# Patient Record
Sex: FEMALE | Race: WHITE | URBAN - METROPOLITAN AREA
[De-identification: names, ages, dates, MRNs, and addresses within clinical notes are randomized per-mention and may not be internally consistent; named-entity substitution may affect disease eponyms.]

---

## 2019-06-05 ENCOUNTER — OFFICE VISIT (OUTPATIENT)
Dept: NEUROLOGY | Age: 24
End: 2019-06-05

## 2019-06-05 VITALS
OXYGEN SATURATION: 98 % | WEIGHT: 217 LBS | HEART RATE: 77 BPM | HEIGHT: 63 IN | DIASTOLIC BLOOD PRESSURE: 78 MMHG | BODY MASS INDEX: 38.45 KG/M2 | SYSTOLIC BLOOD PRESSURE: 118 MMHG

## 2019-06-05 DIAGNOSIS — G43.009 MIGRAINE WITHOUT AURA AND WITHOUT STATUS MIGRAINOSUS, NOT INTRACTABLE: Primary | ICD-10-CM

## 2019-06-05 DIAGNOSIS — R06.83 SNORING: ICD-10-CM

## 2019-06-05 RX ORDER — BUDESONIDE AND FORMOTEROL FUMARATE DIHYDRATE 160; 4.5 UG/1; UG/1
2 AEROSOL RESPIRATORY (INHALATION) 2 TIMES DAILY
COMMUNITY

## 2019-06-05 RX ORDER — ALBUTEROL SULFATE 90 UG/1
AEROSOL, METERED RESPIRATORY (INHALATION)
COMMUNITY

## 2019-06-05 RX ORDER — SUMATRIPTAN 25 MG/1
TABLET, FILM COATED ORAL
Qty: 9 TAB | Refills: 2 | Status: SHIPPED | OUTPATIENT
Start: 2019-06-05 | End: 2019-10-28 | Stop reason: ALTCHOICE

## 2019-06-05 RX ORDER — PROPRANOLOL HYDROCHLORIDE 40 MG/1
TABLET ORAL 4 TIMES DAILY
COMMUNITY
End: 2019-06-05 | Stop reason: DRUGHIGH

## 2019-06-05 RX ORDER — SUMATRIPTAN 25 MG/1
25 TABLET, FILM COATED ORAL
COMMUNITY
End: 2019-06-05 | Stop reason: DRUGHIGH

## 2019-06-05 RX ORDER — PROPRANOLOL HYDROCHLORIDE 120 MG/1
120 CAPSULE, EXTENDED RELEASE ORAL DAILY
Qty: 30 CAP | Refills: 2 | Status: SHIPPED | OUTPATIENT
Start: 2019-06-05 | End: 2019-10-28 | Stop reason: ALTCHOICE

## 2019-06-05 NOTE — PATIENT INSTRUCTIONS
Office Policies  · Phone calls/patient messages:  Please allow up to 24 hours for someone in the office to contact you about your call or message. Be mindful your provider may be out of the office or your message may require further review. We encourage you to use [facility] for your messages as this is a faster, more efficient way to communicate with our office  · Medication Refills:  Prescription medications require up to 48 business hours to process. We encourage you to use [facility] for your refills. For controlled medications: Please allow up to 72 business hours to process. Certain medications may require you to  a written prescription at our office. NO narcotic/controlled medications will be prescribed after 4pm Monday through Friday or on weekends  · Form/Paperwork Completion:  Please note there is a $25 fee for all paperwork completed by our providers. We ask that you allow 7-14 business days. Pre-payment is due prior to picking up/faxing the completed form. You may also download your forms to [facility] to have your doctor print off.

## 2019-06-05 NOTE — LETTER
6/5/19 Patient: Zachary Doran YOB: 1995 Date of Visit: 6/5/2019 Christian Loredo MD 
[address] [address] VIA Facsimile: [phone] Dear Christian Loredo MD, Thank you for referring Ms. Zachary Doran to [address] [place] [address] for evaluation. My notes for this consultation are attached. If you have questions, please do not hesitate to call me. I look forward to following your patient along with you. Sincerely, Shaunna Ram MD

## 2019-06-05 NOTE — PROGRESS NOTES
Neurology Note    Chief Complaint   Patient presents with    New Patient     migraine       HPI/Subjective  Adela Jaquez is a 20 y.o. female who presented to the neurology office for management of headaches. Patient started having headaches when she was in third grade that headache started worsening in 2018. She was taking Excedrin every other day and was started on propranolol and now has one headache a week. She rates it as 7-8 out of 10 in severity. When she takes Excedrin it lasts for an hour without Excedrin it can last throughout the day. They are throbbing and pounding in the left temporal area. They are associated with photophobia, phonophobia and nausea. Baseline headache frequency: One per week. Before propranolol 4 per week    Risk Factors for headaches  Smoking: Denies  Coffee: 0 cups/day  Tea: Occasional cups/day  Soda: 0 cans/day  Water: 6 glasses/day  Sleeps at 9 PM  and wakes up at 7 AM. She does snore. Medications tried  Preventative therapy:  Propranolol    Abortive therapy:  Excedrin  Tylenol      Current Outpatient Medications   Medication Sig    albuterol (VENTOLIN HFA) 90 mcg/actuation inhaler Take  by inhalation.  budesonide-formoterol (SYMBICORT) 160-4.5 mcg/actuation HFAA Take 2 Puffs by inhalation two (2) times a day.  propranolol LA (INDERAL LA) 120 mg SR capsule Take 1 Cap by mouth daily.  SUMAtriptan (IMITREX) 25 mg tablet 1 tab PO at onset of headache, can repeat X 1 in 2 hrs if HA persists. Not more than 10 days/month. No current facility-administered medications for this visit. Allergies no known allergies  Past Medical History:   Diagnosis Date    Asthma     Chronic mental illness     Headache      No past surgical history on file. No family history on file.   Social History     Tobacco Use    Smoking status: Not on file   Substance Use Topics    Alcohol use: Not on file    Drug use: Not on file       REVIEW OF SYSTEMS:   A ten system review of constitutional, cardiovascular, respiratory, musculoskeletal, endocrine, skin, SHEENT, genitourinary, psychiatric and neurologic systems was obtained and is unremarkable with the exception of headaches    EXAMINATION:   Visit Vitals  /78   Pulse 77   Ht 5' 3\" (1.6 m)   Wt 217 lb (98.4 kg)   SpO2 98%   BMI 38.44 kg/m²        General:   General appearance: Pt is in no acute distress   Distal pulses are preserved  Fundoscopic Exam: Normal    Neurological Examination:   Mental Status: AAO x3. Speech is fluent. Follows commands, has normal fund of knowledge, attention, short term recall, comprehension and insight. Cranial Nerves: Visual fields are full. PERRL, Extraocular movements are full. Facial sensation intact. Facial movement intact. Hearing intact to conversation. Palate elevates symmetrically. Shoulder shrug symmetric. Tongue midline. Motor: Strength is 5/5 in all 4 ext. No atrophy. Tone: Normal    Sensation: Normal to light touch    Reflexes: DTRs 2+ throughout. Coordination/Cerebellar: Intact to finger-nose-finger     Gait: Casual gait is normal.     Skin: No significant bruising or lacerations. Laboratory review:   No results found for this or any previous visit. Imaging review:  None    Documentation review:  None    Assessment/Plan:   Laurie Lewis is a 20 y.o. female who presented to the neurology office for management of headaches. Based on the description she does have episodic migraines without aura. She is taking propranolol 40 mg p.o. twice daily. I am going to switch that over to propranolol extended release 120 mg daily. I am also going to start her on Imitrex to be taken at the onset of headache. She does have snoring at night with possible sleep apnea. Will refer her for a sleep study. If she has obstructive sleep apnea there is a risk factor for migraines. Follow-up in 2 to 3 months    No flowsheet data found.   Primary care to address possible depression if PHQ-9 score is more than 9. ICD-10-CM ICD-9-CM    1. Migraine without aura and without status migrainosus, not intractable G43.009 346.10 propranolol LA (INDERAL LA) 120 mg SR capsule      SUMAtriptan (IMITREX) 25 mg tablet      Thank you for allowing me to participate in the care of Ms. Elizabeth Brady. Please feel free to contact me if you have any questions. Justin Banegas MD  Neurologist    CC: Autumn Rodriguez MD  Fax: [phone]    This note was created using voice recognition software. Despite editing, there may be syntax errors.

## 2019-09-23 ENCOUNTER — OFFICE VISIT (OUTPATIENT)
Dept: SLEEP MEDICINE | Age: 24
End: 2019-09-23

## 2019-09-23 VITALS
BODY MASS INDEX: 37.74 KG/M2 | DIASTOLIC BLOOD PRESSURE: 74 MMHG | HEART RATE: 90 BPM | OXYGEN SATURATION: 99 % | WEIGHT: 213 LBS | SYSTOLIC BLOOD PRESSURE: 116 MMHG | HEIGHT: 63 IN

## 2019-09-23 DIAGNOSIS — G47.33 OSA (OBSTRUCTIVE SLEEP APNEA): Primary | ICD-10-CM

## 2019-09-23 DIAGNOSIS — E66.01 SEVERE OBESITY (HCC): ICD-10-CM

## 2019-09-23 RX ORDER — CETIRIZINE HCL 10 MG
TABLET ORAL
COMMUNITY

## 2019-09-23 RX ORDER — MONTELUKAST SODIUM 10 MG/1
10 TABLET ORAL DAILY
COMMUNITY
End: 2019-12-23 | Stop reason: SDUPTHER

## 2019-09-23 NOTE — PROGRESS NOTES
· Patient was educated on proper hookup and operation of the HST. · Instruction forms and documentation were reviewed and signed. · The patient demonstrated good understanding of the HST. · General information regarding operations and maintenance of the device was provided. · She was provided information on sleep apnea including coresponding risk factors and the importance of proper treatment. · Follow-up appointment was made to return the HST. She will be contacted once the results have been reviewed. · She was asked to contact our office for any problems regarding her home sleep test study.     Katherine Ely,RRT,RPSGT, CSE

## 2019-09-23 NOTE — PROGRESS NOTES
[address] [facility] [place]. [place], [facility]  Tel.  [phone]  Fax. [address] [facility] [address]  [place], [address] [facility]  Tel.  [phone]  Fax. [phone] [address]   Tel.  [phone]  Fax. [phone]       Chief Complaint       Chief Complaint   Patient presents with    Sleep Problem     NP refd by Dr. Pamela Robles for snoring       HPI      Ev Fox is 20 y.o. female seen for evaluation of a sleep disorder. She notes a history of snoring and apparent apnea. She normally retires between 10 PM and 7 AM.  She is tired on awakening. She may awaken 3-4 times during the night. She has been told of apparent apnea with snoring that is described as loud. She notes that she will dream frequently. She is tired throughout the day but has not fallen asleep inappropriately. She notes vivid dreaming/nightmares, apparent bruxism. She denies nocturnal incontinence, sleep talking or sleepwalking, hypnagogic hallucinations, sleep paralysis or cataplexy. The patient has not undergone diagnostic testing for the current problems. Patterson Sleepiness Score: 3       No Known Allergies    Current Outpatient Medications   Medication Sig Dispense Refill    cetirizine (ZYRTEC) 10 mg tablet Take  by mouth.  montelukast (SINGULAIR) 10 mg tablet Take 10 mg by mouth daily.  albuterol (VENTOLIN HFA) 90 mcg/actuation inhaler Take  by inhalation.  budesonide-formoterol (SYMBICORT) 160-4.5 mcg/actuation HFAA Take 2 Puffs by inhalation two (2) times a day.  propranolol LA (INDERAL LA) 120 mg SR capsule Take 1 Cap by mouth daily. 30 Cap 2    SUMAtriptan (IMITREX) 25 mg tablet 1 tab PO at onset of headache, can repeat X 1 in 2 hrs if HA persists. Not more than 10 days/month. 9 Tab 2        She  has a past medical history of Asthma, Chronic mental illness, and Headache. She  has no past surgical history on file.     She family history is not on file. She  reports that she has never smoked. She has never used smokeless tobacco. She reports that she drinks about 1.0 standard drinks of alcohol per week. Review of Systems:  Review of Systems   Constitutional: Negative for chills and fever. HENT: Positive for tinnitus. Negative for hearing loss. Eyes: Negative for blurred vision and double vision. Respiratory: Negative for cough and shortness of breath. Cardiovascular: Negative for chest pain and palpitations. Gastrointestinal: Negative for abdominal pain and heartburn. Genitourinary: Negative for frequency and urgency. Musculoskeletal: Negative for back pain and neck pain. Skin: Negative for itching and rash. Neurological: Negative for dizziness and headaches. Psychiatric/Behavioral: Negative for depression. Objective:     Visit Vitals  /74   Pulse 90   Ht 5' 3\" (1.6 m)   Wt 213 lb (96.6 kg)   SpO2 99%   BMI 37.73 kg/m²     Body mass index is 37.73 kg/m². General:   Conversant, cooperative   Eyes:  Pupils equal and reactive, no nystagmus   Oropharynx:   Mallampati score II,  tongue scalloped   Tonsils:     Neck:   No carotid bruits; Neck circ. in \"inches\": 14   Chest/Lungs:  Clear on auscultation    CVS:  Normal rate, regular rhythm   Skin:  Warm to touch; no obvious rashes   Neuro:  Speech fluent, face symmetrical, tongue movement normal   Psych:  Normal affect,  normal countenance        Assessment:       ICD-10-CM ICD-9-CM    1. DMITRY (obstructive sleep apnea) G47.33 327.23 SLEEP STUDY UNATTENDED, 4 CHANNEL   2. Severe obesity (HCC) E66.01 278.01 SLEEP STUDY UNATTENDED, 4 CHANNEL     Potential sleep disordered breathing. She will be evaluated with a home sleep test.  Results to be reviewed with her.     Plan:     Orders Placed This Encounter    SLEEP STUDY UNATTENDED, 4 CHANNEL     Order Specific Question:   Reason for Exam     Answer:   snoring, fatigue       * Patient has a history and examination consistent with the diagnosis of sleep apnea. *Home sleep testing was ordered for initial evaluation. * She was provided information on sleep apnea including corresponding risk factors and the importance of proper treatment. * Treatment options if indicated were reviewed today. Instructions:  o The patient would benefit from weight reduction measures. o Do not engage in activities requiring a normal degree of alertness if fatigue is present. o The patient understands that untreated or undertreated sleep apnea could impair judgement and the ability to function normally during the day.  o Call or return if symptoms worsen or persist.          Charu Jolley MD, FAASM  Electronically signed 09/23/19       This note was created using voice recognition software. Despite editing, there may be syntax errors. This note will not be viewable in [address]

## 2019-09-23 NOTE — PATIENT INSTRUCTIONS

## 2019-10-28 PROBLEM — G47.30 SLEEP APNEA: Status: ACTIVE | Noted: 2019-10-01

## 2022-03-18 PROBLEM — G47.30 SLEEP APNEA: Status: ACTIVE | Noted: 2019-10-01

## 2022-03-19 PROBLEM — E66.01 SEVERE OBESITY (HCC): Status: ACTIVE | Noted: 2019-09-23

## 2022-07-12 ENCOUNTER — TRANSCRIBE ORDER (OUTPATIENT)
Dept: SCHEDULING | Age: 27
End: 2022-07-12

## 2022-07-12 DIAGNOSIS — R14.0 BLOATING: ICD-10-CM

## 2022-07-12 DIAGNOSIS — R11.0 NAUSEA: Primary | ICD-10-CM

## 2022-07-12 DIAGNOSIS — R10.11 RUQ PAIN: ICD-10-CM

## 2023-04-21 DIAGNOSIS — R14.0 BLOATING: ICD-10-CM

## 2023-04-21 DIAGNOSIS — R11.0 NAUSEA: Primary | ICD-10-CM

## 2023-04-21 DIAGNOSIS — R10.11 RUQ PAIN: ICD-10-CM

## 2023-05-22 RX ORDER — PROPRANOLOL HYDROCHLORIDE 10 MG/1
TABLET ORAL
COMMUNITY
Start: 2021-06-07

## 2023-05-22 RX ORDER — BUDESONIDE AND FORMOTEROL FUMARATE DIHYDRATE 160; 4.5 UG/1; UG/1
2 AEROSOL RESPIRATORY (INHALATION) 2 TIMES DAILY
COMMUNITY

## 2023-05-22 RX ORDER — MONTELUKAST SODIUM 10 MG/1
10 TABLET ORAL DAILY
COMMUNITY
Start: 2019-12-23

## 2023-05-22 RX ORDER — ALBUTEROL SULFATE 90 UG/1
AEROSOL, METERED RESPIRATORY (INHALATION)
COMMUNITY

## 2023-05-22 RX ORDER — AMCINONIDE 1 MG/G
CREAM TOPICAL
COMMUNITY
Start: 2020-06-08

## 2023-05-22 RX ORDER — CETIRIZINE HYDROCHLORIDE 10 MG/1
TABLET ORAL
COMMUNITY

## 2023-05-22 RX ORDER — COPPER 313.4 MG/1
INTRAUTERINE DEVICE INTRAUTERINE
COMMUNITY

## 2023-05-22 RX ORDER — HYDROXYZINE HYDROCHLORIDE 10 MG/1
25 TABLET, FILM COATED ORAL
COMMUNITY
Start: 2020-08-15

## 2023-11-30 PROBLEM — E66.01 MORBID OBESITY WITH BMI OF 40.0-44.9, ADULT (HCC): Status: ACTIVE | Noted: 2019-09-23

## 2024-05-22 ENCOUNTER — HOSPITAL ENCOUNTER (OUTPATIENT)
Facility: HOSPITAL | Age: 29
Discharge: HOME | End: 2024-05-25
Payer: COMMERCIAL

## 2024-05-22 DIAGNOSIS — R74.8 ELEVATED LIVER ENZYMES: ICD-10-CM

## 2024-05-22 PROCEDURE — 76705 ECHO EXAM OF ABDOMEN: CPT
